# Patient Record
Sex: FEMALE
[De-identification: names, ages, dates, MRNs, and addresses within clinical notes are randomized per-mention and may not be internally consistent; named-entity substitution may affect disease eponyms.]

---

## 2023-03-28 ENCOUNTER — NURSE TRIAGE (OUTPATIENT)
Dept: OTHER | Facility: CLINIC | Age: 23
End: 2023-03-28

## 2023-03-29 NOTE — TELEPHONE ENCOUNTER
Location of patient: [place]    Subjective: Caller states \"rash\"     Current Symptoms:   Rash - flat and red; on chest and neck area   Written hyrdocortizone- ran out and rash is still going on   Itchy   Painful   States this type of rash started when she was in high school and will get it intermittently- derm following   Denies bleeding   Denies infection   Onset: 2 months ago; gradual    Associated Symptoms: NA    Pain Severity: 5/10; burning; intermittent    Temperature: Denies fever     What has been tried: hyrdocortizone- Rx      LMP:  03/12  Pregnant: No    Recommended disposition: See PCP within 3 Days  Referred to [facility] if unable to get appt due to pt being out of town     Care advice provided, patient verbalizes understanding; denies any other questions or concerns; instructed to call back for any new or worsening symptoms. Patient/caller agrees to follow-up with PCP     This triage is a result of a call to [address] [place] [address]. Please do not respond to the triage nurse through this encounter. Any subsequent communication should be directly with the patient.       Reason for Disposition   [1] Severe localized itching AND [2] after 2 days of steroid cream    Protocols used: Rash or Redness - Localized-ADULT-AH